# Patient Record
Sex: FEMALE | Race: OTHER | URBAN - METROPOLITAN AREA
[De-identification: names, ages, dates, MRNs, and addresses within clinical notes are randomized per-mention and may not be internally consistent; named-entity substitution may affect disease eponyms.]

---

## 2021-02-07 ENCOUNTER — EMERGENCY (EMERGENCY)
Facility: HOSPITAL | Age: 36
LOS: 1 days | Discharge: HOME | End: 2021-02-07
Attending: EMERGENCY MEDICINE | Admitting: EMERGENCY MEDICINE
Payer: SELF-PAY

## 2021-02-07 VITALS
SYSTOLIC BLOOD PRESSURE: 126 MMHG | OXYGEN SATURATION: 100 % | DIASTOLIC BLOOD PRESSURE: 80 MMHG | WEIGHT: 121.92 LBS | TEMPERATURE: 98 F | HEART RATE: 705 BPM | HEIGHT: 64 IN | RESPIRATION RATE: 18 BRPM

## 2021-02-07 DIAGNOSIS — R11.0 NAUSEA: ICD-10-CM

## 2021-02-07 DIAGNOSIS — R00.2 PALPITATIONS: ICD-10-CM

## 2021-02-07 DIAGNOSIS — R07.89 OTHER CHEST PAIN: ICD-10-CM

## 2021-02-07 PROCEDURE — 99284 EMERGENCY DEPT VISIT MOD MDM: CPT

## 2021-02-07 PROCEDURE — 99283 EMERGENCY DEPT VISIT LOW MDM: CPT

## 2021-02-07 RX ORDER — SODIUM CHLORIDE 9 MG/ML
1000 INJECTION INTRAMUSCULAR; INTRAVENOUS; SUBCUTANEOUS ONCE
Refills: 0 | Status: DISCONTINUED | OUTPATIENT
Start: 2021-02-07 | End: 2021-02-11

## 2021-02-07 NOTE — ED ADULT TRIAGE NOTE - CHIEF COMPLAINT QUOTE
36 y/o BIBEMS for evaluation of chest discomfort, palpitations, abdominal pain and nausea today, denies vomiting. Pt denies any past medical history.

## 2021-02-07 NOTE — ED ADULT NURSE NOTE - NS ED NURSE ELOPE COMMENTS
Pt demanding immediate admission and a cardiology consult and when told by the PA the plan of care, pt became upset and said she was leaving and going to another hospital

## 2021-02-07 NOTE — ED ADULT NURSE REASSESSMENT NOTE - NS ED NURSE REASSESS COMMENT FT1
Pt noted to be very anxious and immediately demanded to see a doctor on arrival to ED. Pt repeatedly asked writer to get a "cardiology consult" and demanded writer place her on the cardiac monitor despite having and EKG done in triage and stable vitals. Pt made aware that there was no medical indication for cardiology consult or intensive monitoring. DANYELL Eddy also spoke with pt who then demanded admission and a cardiology consult. Writer attempted to draw labs and get pt to x-ray but she refused and stated that she was going to leave and go to another hospital. DANYELL Eddy aware. Pt noted to be very anxious and immediately demanded to see a doctor on arrival to ED. Pt repeatedly asked writer to get a "cardiology consult" and demanded writer place her on the cardiac monitor despite having and EKG done in triage and stable vitals. Pt made aware that there was no medical indication for cardiology consult or intensive monitoring. DANYELL Eddy also spoke with pt who then again demanded admission and a cardiology consult. Writer attempted to draw labs and get pt to x-ray but she refused and stated that she was going to leave and go to another hospital. DANYELL Eddy aware.

## 2021-02-07 NOTE — ED ADULT NURSE NOTE - NSIMPLEMENTINTERV_GEN_ALL_ED
Implemented All Universal Safety Interventions:  [place] to call system. Call bell, personal items and telephone within reach. Instruct patient to call for assistance. Room bathroom lighting operational. Non-slip footwear when patient is off stretcher. Physically safe environment: no spills, clutter or unnecessary equipment. Stretcher in lowest position, wheels locked, appropriate side rails in place.

## 2021-02-07 NOTE — ED PROVIDER NOTE - CLINICAL SUMMARY MEDICAL DECISION MAKING FREE TEXT BOX
34 y/o f presents c/o palpitations over the past 4 months with another episode today; in ED pt's EKG is sinus at 100, triage HR at 105, other vitals stable.  Exam is unremarkable, discussed with pt plan for labs, cxr and IV fluid which she is unhappy with, wanting cardiology consult and admission "to find out what is wrong with me."  Pt then left ED prior to getting w/u, did not wait for provider to have AMA discussion with her, pt eloped.

## 2021-02-07 NOTE — ED PROVIDER NOTE - OBJECTIVE STATEMENT
36 y/o f no pmh presents c/o palpitations over the past 4 months.  Pt reports she has had episodes where she becomes nauseous and her heart rate elevates into the 140s.  Pt has been to ED four times for this during the past 4 months, each time at [facility].  Pt reports getting labs and EKG and has been discharged to follow up with pmd and cardiology.  Pt doesn't have a pmd but did see a cardiologist last month through [facility], had holter monitor but hasn't sent it back in yet or made another follow up appointment, stating "I haven't had time to do that."  Pt reports another episode today, wants to find out what is causing her symptoms.  Denies CP, SOB, fever, chills, vomiting, diarrhea, cough, all other ROS negative.

## 2024-03-24 NOTE — ED ADULT NURSE NOTE - HIV OFFER
Pt reports having nausea since back surgery on 3/6 but now is having worsening nausea with vomiting and is unable to even keep water down. Reports feeling hot/cold and LLQ abdominal pain as well.      Triage Assessment (Adult)       Row Name 03/24/24 1318          Triage Assessment    Airway WDL WDL        Respiratory WDL    Respiratory WDL WDL        Skin Circulation/Temperature WDL    Skin Circulation/Temperature WDL WDL        Cardiac WDL    Cardiac WDL WDL        Peripheral/Neurovascular WDL    Peripheral Neurovascular WDL WDL        Cognitive/Neuro/Behavioral WDL    Cognitive/Neuro/Behavioral WDL WDL                      Opt out

## 2025-03-03 NOTE — ED ADULT TRIAGE NOTE - PRO INTERPRETER NEED 2
Rona Ryan is a 68 year old female presents to the Urgent Care clinic today with complaints of pelvic pressure and dysuria that started a couple days ago.     -Patient reported constipation  that started the past couple day.     -OTC: prunes, increase in fiber intake- no relief       Patient masked during visit. RN wearing mask, gown, gloves and eye protection during visit.    Patient would like communication of their results via:      Cell Phone:   Telephone Information:   Mobile [phone]     Okay to leave a message containing results? Yes    English